# Patient Record
Sex: MALE | Race: OTHER | Employment: FULL TIME | URBAN - METROPOLITAN AREA
[De-identification: names, ages, dates, MRNs, and addresses within clinical notes are randomized per-mention and may not be internally consistent; named-entity substitution may affect disease eponyms.]

---

## 2021-11-03 ENCOUNTER — TRANSCRIBE ORDER (OUTPATIENT)
Dept: SCHEDULING | Age: 61
End: 2021-11-03

## 2021-11-03 DIAGNOSIS — I49.9 CARDIAC ARRHYTHMIA: ICD-10-CM

## 2021-11-03 DIAGNOSIS — I10 HYPERTENSION: Primary | ICD-10-CM

## 2021-11-24 ENCOUNTER — HOSPITAL ENCOUNTER (OUTPATIENT)
Dept: CT IMAGING | Age: 61
Discharge: HOME | End: 2021-11-24
Attending: FAMILY MEDICINE

## 2021-11-24 DIAGNOSIS — I49.9 CARDIAC ARRHYTHMIA: ICD-10-CM

## 2021-11-24 DIAGNOSIS — I10 HYPERTENSION: ICD-10-CM

## 2021-11-24 PROCEDURE — 75571 CT HRT W/O DYE W/CA TEST: CPT

## 2023-10-11 ENCOUNTER — TELEPHONE (OUTPATIENT)
Age: 63
End: 2023-10-11

## 2023-10-11 NOTE — TELEPHONE ENCOUNTER
Rec'd critical notification from [facility] that on 10/11/23 9:56am CT pt.had AF for 15 minutes max     On 10/11/23 11:26am EDT Afib for 22 min 24 seconds 11:28am EDT max . Pt.has a hx of Afib. Has not been see in the office. Appears he has been seen at [facility].

## 2023-10-12 NOTE — TELEPHONE ENCOUNTER
10/12/23 rec'd the following critical notifications:    10/12/23 11:38am EDT Afib for 19 minutes 53 seconds max HR 183bpm      10/12/23 11:58am EDT Afib for 15 minutes 52 seconds max HR 179bpm.    This is a monitor only order. Care is managed by the [facility]. I spoke w/Erin Talbert at the [place] [id number]. I notified her of yesterdays critical  reports and today's critical reports. She v/u. The [facility] assumes responsibility for this patient. I will scan in [place] critical to Epic per thr [facility]'s request.

## 2023-10-12 NOTE — TELEPHONE ENCOUNTER
Report faxed to [facility]. [facility] that I needed to speak w/someone urgently.

## 2023-10-16 ENCOUNTER — TELEPHONE (OUTPATIENT)
Age: 63
End: 2023-10-16

## 2023-10-16 NOTE — TELEPHONE ENCOUNTER
Rec'd  critical notifications that read as follows  10/15/23 AF for 14 minutes 28 seconds max [id number]     Rec'd critical notification from [facility] that reads  10/14/23 Afib for 14 minutes  55 seconds max . I called the [facility] [phone] :[phone]  lvm to call me. If they are assuming responsibility I will call and see if Carson Moore can fax results directly to them. I spoke w/the [facility] and was advised just to fax over all incoming reports to them and they will take care of them They would like reports faxed to . The 2 reports from today were faxed confirmation rec'd.

## 2023-10-18 ENCOUNTER — TELEPHONE (OUTPATIENT)
Age: 63
End: 2023-10-18

## 2023-10-18 NOTE — TELEPHONE ENCOUNTER
The [facility] clinic called and notified us that  changed Toprol xl from 100mg daily to 150mg daily. They agreed to have all critical results called in w/voice mail and notification that we are faxing a critical then fax the results to them .

## 2023-10-19 ENCOUNTER — TELEPHONE (OUTPATIENT)
Age: 63
End: 2023-10-19

## 2023-10-19 NOTE — TELEPHONE ENCOUNTER
[facility] pt. Critical notification rec'd 10/19/23 8:19 am EDT AF for 14 minutes 29 seconds. Max . I lvm for The [facility] [phone] ext :[phone] that I am faxing critical report as above to them at [phone]      I spoke w/nurse at [facility] she said pt.has been non-compliant w/his medication. They have decide since he had not been taking the Toprol to resume the 100mg. They are aware of incoming critical notification.

## 2023-11-08 ENCOUNTER — TELEPHONE (OUTPATIENT)
Age: 63
End: 2023-11-08

## 2023-11-08 NOTE — TELEPHONE ENCOUNTER
----- Message from Chato Hale DO sent at 11/7/2023  1:51 PM EST -----  Please send [facility] to [facility] for [facility] records and review.   Thanks